# Patient Record
Sex: MALE | Race: WHITE | NOT HISPANIC OR LATINO | Employment: STUDENT | URBAN - METROPOLITAN AREA
[De-identification: names, ages, dates, MRNs, and addresses within clinical notes are randomized per-mention and may not be internally consistent; named-entity substitution may affect disease eponyms.]

---
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
## 2023-05-08 ENCOUNTER — HEALTH MAINTENANCE LETTER (OUTPATIENT)
Age: 18
End: 2023-05-08

## 2024-07-14 ENCOUNTER — HEALTH MAINTENANCE LETTER (OUTPATIENT)
Age: 19
End: 2024-07-14

## 2025-05-22 NOTE — TELEPHONE ENCOUNTER
I have entered the referrals for mother. Brenda will connect with her to help her schedule.     How is Peña doing with his mood at this time?   Refill request ritalin.  Last office visit 7/8.  Is due.  PDMP checked.    Please sign or advise otherwise.     No